# Patient Record
Sex: FEMALE | Race: BLACK OR AFRICAN AMERICAN
[De-identification: names, ages, dates, MRNs, and addresses within clinical notes are randomized per-mention and may not be internally consistent; named-entity substitution may affect disease eponyms.]

---

## 2017-04-28 ENCOUNTER — HOSPITAL ENCOUNTER (INPATIENT)
Dept: HOSPITAL 74 - YASAS | Age: 62
LOS: 4 days | Discharge: HOME | DRG: 897 | End: 2017-05-02
Attending: INTERNAL MEDICINE | Admitting: INTERNAL MEDICINE
Payer: COMMERCIAL

## 2017-04-28 VITALS — BODY MASS INDEX: 32.4 KG/M2

## 2017-04-28 DIAGNOSIS — E78.00: ICD-10-CM

## 2017-04-28 DIAGNOSIS — D64.9: ICD-10-CM

## 2017-04-28 DIAGNOSIS — F32.9: ICD-10-CM

## 2017-04-28 DIAGNOSIS — Z86.19: ICD-10-CM

## 2017-04-28 DIAGNOSIS — F19.24: ICD-10-CM

## 2017-04-28 DIAGNOSIS — F12.20: ICD-10-CM

## 2017-04-28 DIAGNOSIS — Z95.5: ICD-10-CM

## 2017-04-28 DIAGNOSIS — F10.230: Primary | ICD-10-CM

## 2017-04-28 LAB
APPEARANCE UR: (no result)
BACTERIA #/AREA URNS HPF: (no result) /HPF
BILIRUB UR STRIP.AUTO-MCNC: NEGATIVE MG/DL
COLOR UR: YELLOW
KETONES UR QL STRIP: NEGATIVE
LEUKOCYTE ESTERASE UR QL STRIP.AUTO: (no result)
MUCOUS THREADS URNS QL MICRO: (no result)
NITRITE UR QL STRIP: NEGATIVE
PH UR: 5 [PH] (ref 5–8)
PROT UR QL STRIP: NEGATIVE
PROT UR QL STRIP: NEGATIVE
RBC # BLD AUTO: 2 /HPF (ref 0–3)
RBC # UR STRIP: NEGATIVE /UL
SP GR UR: 1.02 (ref 1–1.03)
UROBILINOGEN UR STRIP-MCNC: NEGATIVE E.U./DL (ref 0.2–1)
WBC # UR AUTO: 35 /HPF (ref 3–5)

## 2017-04-28 RX ADMIN — Medication SCH MG: at 22:27

## 2017-04-28 RX ADMIN — NICOTINE SCH MG: 14 PATCH, EXTENDED RELEASE TRANSDERMAL at 13:36

## 2017-04-28 NOTE — CONSULT
[facility] Psychiatric Consult





- Data


Date of interview: 04/28/17


Admission source: [facility]


Identifying data: First admission to [place] campus for this 62 y/o AA female 

seeking detox treatment,on [address],for alcohol and marijuana dependence.Patient 

is single,a mother of two,domiciled,retired from [employer] and 

supported on pension + Social Security Disability benefits.


Substance Abuse History: - Smoking Cessation.  Smoking history: Current every 

day smoker.  Have you smoked in the past 12 months: Yes.  Aproximately how many 

cigarettes per day: 2.  Hx Chewing Tobacco Use: No.  Initiated information on 

smoking cessation: Yes.  'Breaking Loose' booklet given: 04/28/17.  - Substance 

& Tx. History.  Hx Alcohol Use: Yes (RUM/BEER).  Hx Substance Use: Yes (

MARIJUANA).  Substance Use Type: Alcohol, Marijuana.  Hx Substance Use Treatment

: Yes.  - Substances Abused.  ** Alcohol-rum/beer.  Route: Oral.  Frequency: 

Daily.  Amount used: 2 pts./8 (12 oz.).  Age of first use: 10.  Date of Last Use

: 04/27/17.  ** Marijuana.  Route: Smoking.  Frequency: 1-2 times per week.  

Amount used: $10-20.  Age of first use: 19.  Date of Last Use: 04/27/17.  

Confirmed by patient.


Medical History: Hypertension,hypercholesterolemia,anemia in the past,arthritis 

(both knees and hip),cardiac issues (two stents/cardiac catheterizations X 3),

orthosurgery for a torn meniscus and a history of treatment for urinary tract 

infections/syphilis.Hysterectomy (with one ovary left) in 1991.


Psychiatric History: History of two psychiatric hospitalizations at [facility].Diagnosed with MDD.Prescribed paxil 30 mg/day + wellbutrin XL 

300 mg/day + abilify 5 mg/hs.No contact with psychiatric OPD care 

providers.Patient informs that she depends on her primary care doctors for 

medication refills.Last took abilify " a few months ago ".Paxil and bupropion 

are reported to having been taken two days earlier prior to this [facility] visit.Ms Armijo admits to a history of one suicide attempt via wrist-cutting two 

years ago.She requests that paxil,abilify and bupropion be included in this 

regimen of medications.


Physical/Sexual Abuse/Trauma History: Patient denies.


Additional Comment: Urine Drug Screen Results: THC-Marijuana.Noted.





Mental Status Exam





- Mental Status Exam


Alert and Oriented to: Time, Place, Person


Cognitive Function: Good


Patient Appearance: Well Groomed


Mood: Apprehensive, Hopeful


Affect: Appropriate, Normal Range


Patient Behavior: Fatigued, Appropriate, Cooperative


Speech Pattern: Clear, Appropriate


Voice Loudness: Normal


Thought Process: Goal Oriented


Thought Disorder: Not Present


Hallucinations: Denies


Suicidal Ideation: Denies


Homicidal Ideation: Denies


Insight/Judgement: Poor


Sleep: Fair


Appetite: Good


Muscle strength/Tone: Normal


Gait/Station: Normal





Psychiatric Findings





- Problem List (Axis 1, 2,3)


(1) Alcohol dependence with uncomplicated withdrawal


Current Visit: Yes   Status: Acute





(2) Marihuana dependence


Current Visit: Yes   Status: Acute





(3) Substance induced mood disorder


Current Visit: Yes   Status: Acute





(4) Depressive disorder


Current Visit: Yes   Status: Chronic





(5) HTN (hypertension)


Current Visit: Yes   Status: Chronic   Qualifiers: 


     Hypertension type: essential hypertension        Qualified Code(s): I10 - 

Essential (primary) hypertension  





(6) Hypercholesterolemia


Current Visit: Yes   Status: Chronic





(7) S/P angioplasty with stent


Current Visit: Yes   Status: Chronic





(8) S/P cardiac catheterization


Current Visit: Yes   Status: Chronic





(9) History of anemia


Current Visit: Yes   Status: Suspected








- Initial Treatment Plan


Initial Treatment Plan: Psychoeducation.Detoxification.Medications : paxil 20 

mg po daily + wellbutrin  mg po daily + abilify 2 mg po hs (reduced dose)

.Side effects/benefits of each medication are discussed with the patient.She 

agrees with this careplan.Contact was established with [facility] (with 

patient's verbal authorization) at [phone] : filled scripts for wellbutrin 

Xl and paxil 30 mg were claimed on 4/11/17.Last claim for abilify 5 mg occurred 

in september 2016.Daily observation.

## 2017-04-28 NOTE — HP
CIWA Score





- CIWA Score


Nausea/Vomitin


Muscle Tremors: 4-Moderate,w/Arms Extend


Anxiety: 4-Mod. Anxious/Guarded


Agitation: 4-Moderately Restless


Paroxysmal Sweats: 1-Minimal Palms Moist


Orientation: 0-Oriented


Tacttile Disturbances: 3-Moderate Itch/Numb/Burn


Auditory Disturbances: 0-None


Visual Disturbances: 0-None


Headache: 0-None Present


CIWA-Ar Total Score: 21





Admission ROS [facility]





- HPI


Chief Complaint: 


DETOX TX FOR ALCOHOL DEPENDENCE


Allergies/Adverse Reactions: 


 Allergies











Allergy/AdvReac Type Severity Reaction Status Date / Time


 


No Known Allergies Allergy   Verified 17 09:54











History of Present Illness: 


62 Y/O AA/FEMALE WITH A HX OF ALCOHOL DEPENDENCE SEEKING DETOX TX. PT WAS TAKEN 

BY AMBULANCE TO [facility] ER LAST NIGHT DUE TO ALCOHOL INTOXICATION AND 

DISCHARGED THIS MORNING, REFERRED TO DETOX.


Exam Limitations: No Limitations





- Ebola screening


Have you traveled outside of the country in the last 21 days: No


Have you had contact with anyone from an Ebola affected area: No


Have you been sick,other than usual withdrawal symptoms: No


Do you have a fever: No





- Review of Systems


Constitutional: Chills, Loss of Appetite, Night Sweats, Changes in sleep


EENT: reports: Blurred Vision, Nose Congestion, Dental Problems (DENTAL 

EXTRACTION 17.)


Respiratory: reports: No Symptoms reported


Cardiac: reports: Lightheadedness


GI: reports: Diarrhea, Nausea, Poor Appetite, Poor Fluid Intake, Vomiting


: reports: Frequency


Musculoskeletal: reports: Back Pain, Joint Pain, Muscle Pain, Other (HX 

ARTHRITIS KNEE/HIP)


Integumentary: reports: No Symptoms Reported


Neuro: reports: Tremors, Unsteady Gait, Dizziness, Other (HX FALLS DUE TO 

ALCOHOL INTOXICATION; 2 BROKEN LEFT RIB 3/27/17.)


Endocrine: reports: No Symptoms Reported


Hematology: reports: No Symptoms Reported, Anemia


Psychiatric: reports: Orientated x3, Anxious, Depressed


Other Systems: Reviewed and Negative





Patient History





- Patient Medical History


Hx Anemia: Yes (B12  WEEKLY INJ IN THE PAST)


Hx Asthma: No


Hx Chronic Obstructive Pulmonary Disease (COPD): No


Hx Cardiac Disorders: Yes (2 stents-)


Hx Hypertension: Yes (ON MED)


Hx Hypercholesterolemia: Yes (ON MED)


HX Cerebrovascular Accident: No


Hx Seizures: No


Hx Diabetes: No


Hx Gastrointestinal Disorders: No


Hx Genitourinary Disorders: Yes (UTI IN THE PAST)


Hx Sexually Transmitted Disorders: Yes (SYPHILIS TX IN THE PAST)


Hx Renal Disease (ESRD): No


Hx Thyroid Disease: No


Hx Human Immunodeficiency Virus (HIV): No (NEGATIVE HX)


Hx Hepatitis C: No


Hx Depression: Yes (ON MED--ABILIFY;PAXIL)


Hx Suicide Attempt: Yes (cut left wrist in )


Hx Schizophrenia: No


Other Medical History: HX 1 STENT IN LEFT THIGH;BALLON I RIGHT THIGH- 2016





- Patient Surgical History


Past Surgical History: Yes


Hx Neurologic Surgery: No


Hx Cataract Extraction: No


Hx Cardiac Surgery: Yes (2 stents/cardiac catheterizations X 3)


Hx Lung Surgery: No


Hx Breast Surgery: No


Hx Breast Biopsy: No


Hx Abdominal Surgery: No


Hx Appendectomy: No


Hx Cholecystectomy: No


Hx Genitourinary Surgery: No


Hx  Section: No


Hx Orthopedic Surgery: No


Hx Hysterectomy: Yes (; WITH ONE OVARY INTACT.)


Other Surgical History: balloon, right extremity/stent. left extremity


Anesthesia Reaction: No





- PPD History


Previous Implant?: Yes


Documented Results: Positive w/o proof


Implanted On Prior [facility] Admission?: No


Results: CXR TBD


PPD to be Administered?: No





- Reproductive History


Patient is a Female of Child Bearing Age (11 -55 yrs old): No (HX HYSTERECTOMY W

/ ONE OVARY IN PLACE.)


LMP comment: -HYSTERECTOMY


Patient Pregnant: No





- Smoking Cessation


Smoking history: Current every day smoker


Have you smoked in the past 12 months: Yes


Aproximately how many cigarettes per day: 2


Hx Chewing Tobacco Use: No


Initiated information on smoking cessation: Yes


'Breaking Loose' booklet given: 17





- Substance & Tx. History


Hx Alcohol Use: Yes (RUM/BEER)


Hx Substance Use: Yes (MARIJUANA)


Substance Use Type: Alcohol, Marijuana


Hx Substance Use Treatment: Yes





- Substances Abused


  ** Alcohol-rum/beer


Route: Oral


Frequency: Daily


Amount used: 2 pts./8 (12 oz.)


Age of first use: 10


Date of Last Use: 17





  ** Marijuana


Route: Smoking


Frequency: 1-2 times per week


Amount used: $10-20


Age of first use: 19


Date of Last Use: 17





Family Disease History





- Family Disease History


Family Disease History: Heart Disease: Mother (MI; STROKE-), Other: 

Father (AIDS-), Mother





Admission Physical Exam [facility]





- Vital Signs


Vital Signs: 


 Vital Signs - 24 hr











  17





  08:55


 


Temperature 98.3 F


 


Pulse Rate 102 H


 


Respiratory 20





Rate 


 


Blood Pressure 159/104














- Physical


General Appearance: Yes: Moderate Distress, Obese, Irritable, Anxious


HEENTM: Yes: EOMI, Normocephalic, JEANNA, Pharynx Normal


Respiratory: Yes: Chest Non-Tender, Lungs Clear, Normal Breath Sounds, No 

Respiratory Distress


Neck: Yes: Supple, Trachea in good position


Breast: Yes: Breast Exam Deferred


Cardiology: Yes: Regular Rhythm, S1, S2, Tachycardia


Abdominal: Yes: Normal Bowel Sounds, Non Tender, Soft, Protuberent


Genitourinary: Yes: Other (N/C)


Back: Yes: Within Normal Limits


Musculoskeletal: Yes: full range of Motion, Gait Steady


Extremities: Yes: Normal Range of Motion, Non-Tender, Tremors


Neurological: Yes: CNs II-XII NML intact, Fully Oriented, Alert


Integumentary: Yes: Dry, Warm


Lymphatic: Yes: Within Normal Limits





- Diagnostic


(1) Alcohol dependence with uncomplicated withdrawal


Current Visit: Yes   Status: Acute





(2) HTN (hypertension)


Current Visit: Yes   Status: Chronic   Qualifiers: 


     Hypertension type: essential hypertension        Qualified Code(s): I10 - 

Essential (primary) hypertension  





(3) Hypercholesterolemia


Current Visit: Yes   Status: Chronic





(4) S/P cardiac catheterization


Current Visit: Yes   Status: Chronic





(5) S/P angioplasty with stent


Current Visit: Yes   Status: Chronic





(6) History of anemia


Current Visit: Yes   Status: Suspected








Cleared for Admission [facility]





- Detox or Rehab


[facility] Level of Care: Medically Managed


Detox Regimen/Protocol: Librium





BHS Breath Alcohol Content


Breath Alcohol Content: 0





Urine Pregancy Test





- Result


Urine Pregnancy Test Results: Negative- NO Line Present





Urine Drug Screen





- Results


Drug Screen Negative: No


Urine Drug Screen Results: THC-Marijuana

## 2017-04-29 LAB
ALBUMIN SERPL-MCNC: 3.8 G/DL (ref 3.4–5)
ALP SERPL-CCNC: 97 U/L (ref 45–117)
ALT SERPL-CCNC: 26 U/L (ref 12–78)
ANION GAP SERPL CALC-SCNC: 10 MMOL/L (ref 8–16)
APPEARANCE UR: CLEAR
AST SERPL-CCNC: 29 U/L (ref 15–37)
BILIRUB SERPL-MCNC: 0.5 MG/DL (ref 0.2–1)
BILIRUB UR STRIP.AUTO-MCNC: NEGATIVE MG/DL
CALCIUM SERPL-MCNC: 9.2 MG/DL (ref 8.5–10.1)
CO2 SERPL-SCNC: 25 MMOL/L (ref 21–32)
COCKROFT - GAULT: 86.11
COLOR UR: YELLOW
CREAT SERPL-MCNC: 1 MG/DL (ref 0.55–1.02)
DEPRECATED RDW RBC AUTO: 15.2 % (ref 11.6–15.6)
GLUCOSE SERPL-MCNC: 97 MG/DL (ref 74–106)
HIV 1 & 2 AB: NEGATIVE
HIV 1 AGP24: NEGATIVE
KETONES UR QL STRIP: NEGATIVE
LEUKOCYTE ESTERASE UR QL STRIP.AUTO: (no result)
MCH RBC QN AUTO: 29.3 PG (ref 25.7–33.7)
MCHC RBC AUTO-ENTMCNC: 31.1 G/DL (ref 32–36)
MCV RBC: 93.9 FL (ref 80–96)
NITRITE UR QL STRIP: NEGATIVE
PH UR: 6 [PH] (ref 5–8)
PLATELET # BLD AUTO: 185 K/MM3 (ref 134–434)
PMV BLD: 9.9 FL (ref 7.5–11.1)
PROT SERPL-MCNC: 7.7 G/DL (ref 6.4–8.2)
PROT UR QL STRIP: NEGATIVE
PROT UR QL STRIP: NEGATIVE
RBC # UR STRIP: NEGATIVE /UL
SICKLE CELL SCREEN: NEGATIVE
SP GR UR: 1.01 (ref 1–1.03)
UROBILINOGEN UR STRIP-MCNC: NEGATIVE E.U./DL (ref 0.2–1)
WBC # BLD AUTO: 8.3 K/MM3 (ref 4–10)

## 2017-04-29 RX ADMIN — PAROXETINE HYDROCHLORIDE SCH MG: 20 TABLET, FILM COATED ORAL at 10:12

## 2017-04-29 RX ADMIN — NICOTINE SCH MG: 14 PATCH, EXTENDED RELEASE TRANSDERMAL at 10:13

## 2017-04-29 RX ADMIN — Medication SCH TAB: at 10:12

## 2017-04-29 RX ADMIN — AMLODIPINE BESYLATE SCH MG: 10 TABLET ORAL at 10:12

## 2017-04-29 RX ADMIN — LISINOPRIL SCH MG: 20 TABLET ORAL at 10:12

## 2017-04-29 RX ADMIN — Medication SCH MG: at 22:12

## 2017-04-29 NOTE — PN
S CIWA





- CIWA Score


Nausea/Vomiting: 3


Muscle Tremors: 3


Anxiety: 3


Agitation: 3


Paroxysmal Sweats: 1-Minimal Palms Moist


Orientation: 0-Oriented


Tacttile Disturbances: 1-Very Mild Itch/Numbness


Auditory Disturbances: 1-Very Mild


Visual Disturbances: 1-Very Mild Sensitivity


Headache: 2-Mild


CIWA-Ar Total Score: 18





BHS Progress Note (SOAP)


Subjective: 


ALERT,IRRITABLE,ANXIOUS,INTERRUPTED SLEEP,TREMOR


Objective: 


04/29/17 10:12


 Vital Signs











Temperature  97.6 F   04/29/17 10:07


 


Pulse Rate  99 H  04/29/17 10:07


 


Respiratory Rate  20   04/29/17 10:07


 


Blood Pressure  148/109   04/29/17 10:07


 


O2 Sat by Pulse Oximetry (%)      








EKG NSR


NO CHEST PAIN,NO SB,NO DIZZINESS


 Laboratory Last Values











Urine Color  Yellow   04/28/17  14:00    


 


Urine Appearance  Slcloudy   04/28/17  14:00    


 


Urine pH  5.0  (5.0-8.0)   04/28/17  14:00    


 


Ur Specific Gravity  1.017  (1.001-1.035)   04/28/17  14:00    


 


Urine Protein  Negative  (NEGATIVE)   04/28/17  14:00    


 


Urine Glucose (UA)  Negative  (NEGATIVE)   04/28/17  14:00    


 


Urine Ketones  Negative  (NEGATIVE)   04/28/17  14:00    


 


Urine Blood  Negative  (NEGATIVE)   04/28/17  14:00    


 


Urine Nitrite  Negative  (NEGATIVE)   04/28/17  14:00    


 


Urine Bilirubin  Negative  (NEGATIVE)   04/28/17  14:00    


 


Urine Urobilinogen  Negative E.U./dl (0.2-1.0)   04/28/17  14:00    


 


Ur Leukocyte Esterase  1+  (NEGATIVE)  H  04/28/17  14:00    


 


Urine RBC  2 /hpf (0-3)   04/28/17  14:00    


 


Urine WBC  35 /hpf (3-5)   04/28/17  14:00    


 


Ur Epithelial Cells  Many /hpf (FEW)   04/28/17  14:00    


 


Urine Bacteria  Rare /hpf (NONE SEEN)   04/28/17  14:00    


 


Urine Mucus  Rare   04/28/17  14:00    


 


HIV 1&2 Antibody Screen  Negative   04/28/17  10:50    


 


HIV P24 Antigen  Negative   04/28/17  10:50    














04/29/17 10:13


LABS PENDING


Assessment: 


04/29/17 10:13


WITHDRAWAL SYMPTOM





04/29/17 10:13





Plan: 


CONTINUE DETOX,ENCOURAGE ORAL FLUID,REPEAT UA

## 2017-04-30 RX ADMIN — NICOTINE SCH MG: 14 PATCH, EXTENDED RELEASE TRANSDERMAL at 11:10

## 2017-04-30 RX ADMIN — Medication SCH MG: at 22:19

## 2017-04-30 RX ADMIN — AMLODIPINE BESYLATE SCH MG: 10 TABLET ORAL at 11:09

## 2017-04-30 RX ADMIN — Medication SCH TAB: at 11:09

## 2017-04-30 RX ADMIN — PAROXETINE HYDROCHLORIDE SCH MG: 20 TABLET, FILM COATED ORAL at 11:10

## 2017-04-30 RX ADMIN — LISINOPRIL SCH MG: 20 TABLET ORAL at 11:10

## 2017-04-30 NOTE — PN
S CIWA





- CIWA Score


Nausea/Vomiting: 3


Muscle Tremors: 3


Anxiety: 3


Agitation: 3


Paroxysmal Sweats: 1-Minimal Palms Moist


Orientation: 0-Oriented


Tacttile Disturbances: 1-Very Mild Itch/Numbness


Auditory Disturbances: 1-Very Mild


Visual Disturbances: 1-Very Mild Sensitivity


Headache: 2-Mild


CIWA-Ar Total Score: 18





BHS Progress Note (SOAP)


Subjective: 


ALERT,IRRITABLE,ANXIOUS,INTERRUPTED SLEEP,TREMOR


Objective: 





04/30/17 10:37


 Vital Signs











Temperature  98.2 F   04/30/17 10:06


 


Pulse Rate  101 H  04/30/17 10:06


 


Respiratory Rate  18   04/30/17 10:06


 


Blood Pressure  150/119   04/30/17 10:06


 


O2 Sat by Pulse Oximetry (%)      








 Laboratory Last Values











WBC  8.3 K/mm3 (4.0-10.0)   04/29/17  06:05    


 


RBC  5.11 M/mm3 (3.60-5.2)   04/29/17  06:05    


 


Hgb  15.0 GM/dL (10.7-15.3)   04/29/17  06:05    


 


Hct  48.0 % (32.4-45.2)  H  04/29/17  06:05    


 


MCV  93.9 fl (80-96)   04/29/17  06:05    


 


MCHC  31.1 g/dl (32.0-36.0)  L  04/29/17  06:05    


 


RDW  15.2 % (11.6-15.6)   04/29/17  06:05    


 


Plt Count  185 K/MM3 (134-434)   04/29/17  06:05    


 


MPV  9.9 fl (7.5-11.1)   04/29/17  06:05    


 


Sickle Cell Screen  Negative  (NEGATIVE)   04/29/17  06:05    


 


Sodium  142 mmol/L (136-145)   04/29/17  06:05    


 


Potassium  4.2 mmol/L (3.5-5.1)   04/29/17  06:05    


 


Chloride  107 mmol/L ()   04/29/17  06:05    


 


Carbon Dioxide  25 mmol/L (21-32)   04/29/17  06:05    


 


Anion Gap  10  (8-16)   04/29/17  06:05    


 


BUN  12 mg/dL (7-18)   04/29/17  06:05    


 


Creatinine  1.0 mg/dL (0.55-1.02)   04/29/17  06:05    


 


Creat Clearance w eGFR  56.56  (>60)   04/29/17  06:05    


 


Random Glucose  97 mg/dL ()   04/29/17  06:05    


 


Calcium  9.2 mg/dL (8.5-10.1)   04/29/17  06:05    


 


Total Bilirubin  0.5 mg/dL (0.2-1.0)   04/29/17  06:05    


 


AST  29 U/L (15-37)   04/29/17  06:05    


 


ALT  26 U/L (12-78)   04/29/17  06:05    


 


Alkaline Phosphatase  97 U/L ()   04/29/17  06:05    


 


Total Protein  7.7 g/dl (6.4-8.2)   04/29/17  06:05    


 


Albumin  3.8 g/dl (3.4-5.0)   04/29/17  06:05    


 


Urine Color  Yellow   04/29/17  Unknown


 


Urine Appearance  Clear   04/29/17  Unknown


 


Urine pH  6.0  (5.0-8.0)   04/29/17  Unknown


 


Ur Specific Gravity  1.015  (1.001-1.035)   04/29/17  Unknown


 


Urine Protein  Negative  (NEGATIVE)   04/29/17  Unknown


 


Urine Glucose (UA)  Negative  (NEGATIVE)   04/29/17  Unknown


 


Urine Ketones  Negative  (NEGATIVE)   04/29/17  Unknown


 


Urine Blood  Negative  (NEGATIVE)   04/29/17  Unknown


 


Urine Nitrite  Negative  (NEGATIVE)   04/29/17  Unknown


 


Urine Bilirubin  Negative  (NEGATIVE)   04/29/17  Unknown


 


Urine Urobilinogen  Negative E.U./dl (0.2-1.0)   04/29/17  Unknown


 


Ur Leukocyte Esterase  Trace  (NEGATIVE)  H D 04/29/17  Unknown


 


Urine RBC  2 /hpf (0-3)   04/28/17  14:00    


 


Urine WBC  35 /hpf (3-5)   04/28/17  14:00    


 


Ur Epithelial Cells  Many /hpf (FEW)   04/28/17  14:00    


 


Urine Bacteria  Rare /hpf (NONE SEEN)   04/28/17  14:00    


 


Urine Mucus  Rare   04/28/17  14:00    


 


RPR Titer  Nonreactive  (NONREACTIVE)   04/29/17  06:05    


 


HIV 1&2 Antibody Screen  Negative   04/28/17  10:50    


 


HIV P24 Antigen  Negative   04/28/17  10:50    











Assessment: 





04/30/17 10:37


WITHDRAWAL SYMPTOM


Plan: 


CONTINUE DETOX

## 2017-04-30 NOTE — PN
BHS Progress Note


Note: 


PATIENT COMPLAINED OF PAIN TO LEFT RIB CAGE ANTERIOR


HISTORY OF FX OF LEFT 6TH AND 7TH RIB ON 04/27/17 TREATED AT [facility] 

AFTER A FALL UNDER INFLUENCE OF ALCOHOL AT THAT TIME


S/P ANGIOPLASTY WITH STENT IN 2015 AT [facility]


STATED HAD CARDIAC CATH IN 04/27/17 WAS TOLD TO BE OK


ALERT


NO JUGULAR VEIN DILATATION


HEART NORMAL HEART SOUND,S1S2,NO MURMUR


LUNG CLEAR,NO WHEEZING


PAIN AND TENDERNESS OVER THE ANTERIOR RIB CAGE 6TH AND 7TH


ABDOMEN SOFT,NO DISTENSION


             NO PAIN OR TENDERNESS


NO CALF TENDERNESS


IMPRESSION PAIN SECONDARY TO FX LEFT 6TH AND 7TH RIBS


TREATMENT MOTRIN 600 MGS PO NOW


CLOSED MONITORING


CONTINUE DETOX

## 2017-04-30 NOTE — PN
BHS Progress Note


Note: 


addendum patient has cardiac catheterization in [facility] on 04/20/17 was told 

to be ok


fell while intoxicated on 03/27/17 treated at [facility] with fx left 6th 

and 7th ribs

## 2017-05-01 RX ADMIN — METOPROLOL SUCCINATE SCH MG: 25 TABLET, EXTENDED RELEASE ORAL at 10:15

## 2017-05-01 RX ADMIN — ANALGESIC BALM SCH: 1.74; 4.06 OINTMENT TOPICAL at 22:08

## 2017-05-01 RX ADMIN — PAROXETINE HYDROCHLORIDE SCH MG: 20 TABLET, FILM COATED ORAL at 10:15

## 2017-05-01 RX ADMIN — CLOPIDOGREL BISULFATE SCH MG: 75 TABLET, FILM COATED ORAL at 10:15

## 2017-05-01 RX ADMIN — ANALGESIC BALM SCH APPLIC: 1.74; 4.06 OINTMENT TOPICAL at 10:16

## 2017-05-01 RX ADMIN — LISINOPRIL SCH MG: 20 TABLET ORAL at 10:15

## 2017-05-01 RX ADMIN — Medication SCH TAB: at 10:14

## 2017-05-01 RX ADMIN — NICOTINE SCH MG: 14 PATCH, EXTENDED RELEASE TRANSDERMAL at 10:15

## 2017-05-01 RX ADMIN — AMLODIPINE BESYLATE SCH MG: 10 TABLET ORAL at 10:15

## 2017-05-01 RX ADMIN — Medication SCH MG: at 22:07

## 2017-05-01 NOTE — PN
BHS Progress Note (SOAP)


Subjective: 


sweats, rib pain on rt 


Objective: 





05/01/17 09:34


 Vital Signs











Temperature  98.1 F   05/01/17 06:00


 


Pulse Rate  85   05/01/17 06:00


 


Respiratory Rate  18   05/01/17 06:00


 


Blood Pressure  141/97   05/01/17 06:00


 


O2 Sat by Pulse Oximetry (%)      








 Laboratory Tests











  04/28/17 04/28/17 04/29/17





  10:50 14:00 06:05


 


WBC    8.3


 


RBC    5.11


 


Hgb    15.0


 


Hct    48.0 H


 


MCV    93.9


 


MCHC    31.1 L


 


RDW    15.2


 


Plt Count    185


 


MPV    9.9


 


Sickle Cell Screen    Negative


 


Sodium   


 


Potassium   


 


Chloride   


 


Carbon Dioxide   


 


Anion Gap   


 


BUN   


 


Creatinine   


 


Creat Clearance w eGFR   


 


Random Glucose   


 


Calcium   


 


Total Bilirubin   


 


AST   


 


ALT   


 


Alkaline Phosphatase   


 


Total Protein   


 


Albumin   


 


Urine Color   Yellow 


 


Urine Appearance   Slcloudy 


 


Urine pH   5.0 


 


Ur Specific Gravity   1.017 


 


Urine Protein   Negative 


 


Urine Glucose (UA)   Negative 


 


Urine Ketones   Negative 


 


Urine Blood   Negative 


 


Urine Nitrite   Negative 


 


Urine Bilirubin   Negative 


 


Urine Urobilinogen   Negative 


 


Ur Leukocyte Esterase   1+ H 


 


Urine RBC   2 


 


Urine WBC   35 


 


Ur Epithelial Cells   Many 


 


Urine Bacteria   Rare 


 


Urine Mucus   Rare 


 


RPR Titer   


 


HIV 1&2 Antibody Screen  Negative  


 


HIV P24 Antigen  Negative  














  04/29/17 04/29/17 04/29/17





  06:05 06:05 Unknown


 


WBC   


 


RBC   


 


Hgb   


 


Hct   


 


MCV   


 


MCHC   


 


RDW   


 


Plt Count   


 


MPV   


 


Sickle Cell Screen   


 


Sodium  142  


 


Potassium  4.2  


 


Chloride  107  


 


Carbon Dioxide  25  


 


Anion Gap  10  


 


BUN  12  


 


Creatinine  1.0  


 


Creat Clearance w eGFR  56.56  


 


Random Glucose  97  


 


Calcium  9.2  


 


Total Bilirubin  0.5  


 


AST  29  


 


ALT  26  


 


Alkaline Phosphatase  97  


 


Total Protein  7.7  


 


Albumin  3.8  


 


Urine Color    Yellow


 


Urine Appearance    Clear


 


Urine pH    6.0


 


Ur Specific Gravity    1.015


 


Urine Protein    Negative


 


Urine Glucose (UA)    Negative


 


Urine Ketones    Negative


 


Urine Blood    Negative


 


Urine Nitrite    Negative


 


Urine Bilirubin    Negative


 


Urine Urobilinogen    Negative


 


Ur Leukocyte Esterase    Trace H D


 


Urine RBC   


 


Urine WBC   


 


Ur Epithelial Cells   


 


Urine Bacteria   


 


Urine Mucus   


 


RPR Titer   Nonreactive 


 


HIV 1&2 Antibody Screen   


 


HIV P24 Antigen   











Assessment: 


05/01/17 09:34


withdrawal sx's 


h/o rib fx





05/01/17 10:52





Plan: 


cont. detox 


increase fluids 


analgesic balm 


motrin prn

## 2017-05-01 NOTE — EKG
Test Reason : 

Blood Pressure : ***/*** mmHG

Vent. Rate : 093 BPM     Atrial Rate : 093 BPM

   P-R Int : 138 ms          QRS Dur : 086 ms

    QT Int : 356 ms       P-R-T Axes : 060 055 043 degrees

   QTc Int : 442 ms

 

NORMAL SINUS RHYTHM

POSSIBLE LEFT ATRIAL ENLARGEMENT

BORDERLINE ECG

NO PREVIOUS ECGS AVAILABLE

Confirmed by ALEX PARKS, BEHZAD ([id number]) on 5/1/2017 12:15:09 AM

 

Referred By: Radu Luz           Confirmed By:BEHZAD JOHNSON MD

## 2017-05-02 VITALS — HEART RATE: 89 BPM | DIASTOLIC BLOOD PRESSURE: 94 MMHG | SYSTOLIC BLOOD PRESSURE: 156 MMHG

## 2017-05-02 VITALS — TEMPERATURE: 98.2 F

## 2017-05-02 PROCEDURE — HZ2ZZZZ DETOXIFICATION SERVICES FOR SUBSTANCE ABUSE TREATMENT: ICD-10-PCS | Performed by: INTERNAL MEDICINE

## 2017-05-02 RX ADMIN — ANALGESIC BALM SCH: 1.74; 4.06 OINTMENT TOPICAL at 09:33

## 2017-05-02 RX ADMIN — METOPROLOL SUCCINATE SCH MG: 25 TABLET, EXTENDED RELEASE ORAL at 09:32

## 2017-05-02 RX ADMIN — LISINOPRIL SCH MG: 20 TABLET ORAL at 09:32

## 2017-05-02 RX ADMIN — CLOPIDOGREL BISULFATE SCH MG: 75 TABLET, FILM COATED ORAL at 09:32

## 2017-05-02 RX ADMIN — PAROXETINE HYDROCHLORIDE SCH MG: 20 TABLET, FILM COATED ORAL at 09:32

## 2017-05-02 RX ADMIN — Medication SCH TAB: at 09:32

## 2017-05-02 RX ADMIN — AMLODIPINE BESYLATE SCH MG: 10 TABLET ORAL at 09:32

## 2017-05-02 NOTE — DS
BHS Detox Discharge Summary


Admission Date: 


04/28/17





Discharge Date: 05/02/17





- History


Present History: Alcohol Dependence, Cannabis Dependence





- Physical Exam Results


Vital Signs: 


 Vital Signs











Temperature  98.2 F   05/02/17 06:00


 


Pulse Rate  78   05/02/17 06:00


 


Respiratory Rate  18   05/02/17 06:00


 


Blood Pressure  147/93   05/02/17 06:00


 


O2 Sat by Pulse Oximetry (%)      














- Treatment


Hospital Course: Detox Protocol Followed, Detoxed Safely, Responded well, 

Discharged Condition Good, Rehab Referral Accepted





- Medication


Discharge Medications: 


Ambulatory Orders





Amlodipine Besylate [Norvasc -] 10 mg PO DAILY 04/28/17 


Aripiprazole [Abilify -] 5 mg PO DAILY 04/28/17 


Atorvastatin Ca [Lipitor] 80 mg PO HS 04/28/17 


Bupropion HCl [Wellbutrin Sr] 150 mg PO BID 04/28/17 


Clopidogrel Bisulfate [Plavix -] 75 mg PO DAILY 04/28/17 


Lisinopril [Prinivil] 20 mg PO DAILY 04/28/17 


Metoprolol Succinate [Toprol Xl -] 25 mg PO DAILY 04/28/17 


Nicotine Patch [Nicoderm Patch -] 1 patch TD DAILY 04/28/17 


Paroxetine HCl [Paxil -] 30 mg PO DAILY 04/28/17 











- Diagnosis


(1) Alcohol dependence with uncomplicated withdrawal


Current Visit: Yes   Status: Chronic





(2) Marihuana dependence


Current Visit: Yes   Status: Chronic





(3) Substance induced mood disorder


Current Visit: Yes   Status: Chronic





(4) Depressive disorder


Current Visit: Yes   Status: Chronic





(5) HTN (hypertension)


Current Visit: Yes   Status: Chronic   Qualifiers: 


     Hypertension type: essential hypertension        Qualified Code(s): I10 - 

Essential (primary) hypertension  





(6) Hypercholesterolemia


Current Visit: Yes   Status: Chronic





(7) S/P angioplasty with stent


Current Visit: Yes   Status: Chronic





(8) S/P cardiac catheterization


Current Visit: Yes   Status: Chronic





(9) History of anemia


Current Visit: Yes   Status: Suspected








- AMA


Did Patient Leave Against Medical Advice: No

## 2020-09-28 ENCOUNTER — HOSPITAL ENCOUNTER (INPATIENT)
Dept: HOSPITAL 74 - YASAS | Age: 65
LOS: 14 days | Discharge: HOME | DRG: 895 | End: 2020-10-12
Attending: ALLERGY & IMMUNOLOGY | Admitting: ALLERGY & IMMUNOLOGY
Payer: COMMERCIAL

## 2020-09-28 VITALS — BODY MASS INDEX: 24.3 KG/M2

## 2020-09-28 DIAGNOSIS — F12.20: ICD-10-CM

## 2020-09-28 DIAGNOSIS — Z91.5: ICD-10-CM

## 2020-09-28 DIAGNOSIS — Z87.440: ICD-10-CM

## 2020-09-28 DIAGNOSIS — I10: ICD-10-CM

## 2020-09-28 DIAGNOSIS — F10.20: Primary | ICD-10-CM

## 2020-09-28 DIAGNOSIS — L29.2: ICD-10-CM

## 2020-09-28 DIAGNOSIS — Z99.89: ICD-10-CM

## 2020-09-28 DIAGNOSIS — I25.10: ICD-10-CM

## 2020-09-28 DIAGNOSIS — Z95.5: ICD-10-CM

## 2020-09-28 DIAGNOSIS — Z86.19: ICD-10-CM

## 2020-09-28 DIAGNOSIS — Z95.820: ICD-10-CM

## 2020-09-28 DIAGNOSIS — Z88.5: ICD-10-CM

## 2020-09-28 DIAGNOSIS — R76.11: ICD-10-CM

## 2020-09-28 DIAGNOSIS — G47.00: ICD-10-CM

## 2020-09-28 DIAGNOSIS — D50.9: ICD-10-CM

## 2020-09-28 DIAGNOSIS — F19.20: ICD-10-CM

## 2020-09-28 DIAGNOSIS — G40.509: ICD-10-CM

## 2020-09-28 DIAGNOSIS — Z88.8: ICD-10-CM

## 2020-09-28 DIAGNOSIS — F14.20: ICD-10-CM

## 2020-09-28 DIAGNOSIS — F17.210: ICD-10-CM

## 2020-09-28 PROCEDURE — U0003 INFECTIOUS AGENT DETECTION BY NUCLEIC ACID (DNA OR RNA); SEVERE ACUTE RESPIRATORY SYNDROME CORONAVIRUS 2 (SARS-COV-2) (CORONAVIRUS DISEASE [COVID-19]), AMPLIFIED PROBE TECHNIQUE, MAKING USE OF HIGH THROUGHPUT TECHNOLOGIES AS DESCRIBED BY CMS-2020-01-R: HCPCS

## 2020-09-28 PROCEDURE — HZ42ZZZ GROUP COUNSELING FOR SUBSTANCE ABUSE TREATMENT, COGNITIVE-BEHAVIORAL: ICD-10-PCS | Performed by: ALLERGY & IMMUNOLOGY

## 2020-09-28 RX ADMIN — Medication SCH MG: at 21:15

## 2020-09-29 LAB
ALBUMIN SERPL-MCNC: 3.5 G/DL (ref 3.4–5)
ALP SERPL-CCNC: 99 U/L (ref 45–117)
ALT SERPL-CCNC: 32 U/L (ref 13–61)
ANION GAP SERPL CALC-SCNC: 6 MMOL/L (ref 8–16)
APPEARANCE UR: CLEAR
AST SERPL-CCNC: 27 U/L (ref 15–37)
BACTERIA # UR AUTO: 225 /UL (ref 0–1359)
BILIRUB SERPL-MCNC: 0.4 MG/DL (ref 0.2–1)
BILIRUB UR STRIP.AUTO-MCNC: NEGATIVE MG/DL
BUN SERPL-MCNC: 22 MG/DL (ref 7–18)
CALCIUM SERPL-MCNC: 9.1 MG/DL (ref 8.5–10.1)
CASTS URNS QL MICRO: 1 /UL (ref 0–3.1)
CHLORIDE SERPL-SCNC: 104 MMOL/L (ref 98–107)
CO2 SERPL-SCNC: 28 MMOL/L (ref 21–32)
COLOR UR: YELLOW
CREAT SERPL-MCNC: 1.5 MG/DL (ref 0.55–1.3)
DEPRECATED RDW RBC AUTO: 14.7 % (ref 11.6–15.6)
EPITH CASTS URNS QL MICRO: 16 /UL (ref 0–25.1)
GLUCOSE SERPL-MCNC: 136 MG/DL (ref 74–106)
HCT VFR BLD CALC: 44.7 % (ref 32.4–45.2)
HGB BLD-MCNC: 14.2 GM/DL (ref 10.7–15.3)
KETONES UR QL STRIP: NEGATIVE
LEUKOCYTE ESTERASE UR QL STRIP.AUTO: (no result)
MCH RBC QN AUTO: 28.5 PG (ref 25.7–33.7)
MCHC RBC AUTO-ENTMCNC: 31.8 G/DL (ref 32–36)
MCV RBC: 89.5 FL (ref 80–96)
NITRITE UR QL STRIP: NEGATIVE
PH UR: 6 [PH] (ref 5–8)
PLATELET # BLD AUTO: 213 K/MM3 (ref 134–434)
PMV BLD: 10.7 FL (ref 7.5–11.1)
POTASSIUM SERPLBLD-SCNC: 4 MMOL/L (ref 3.5–5.1)
PROT SERPL-MCNC: 7.3 G/DL (ref 6.4–8.2)
PROT UR QL STRIP: NEGATIVE
PROT UR QL STRIP: NEGATIVE
RBC # BLD AUTO: 4.99 M/MM3 (ref 3.6–5.2)
RBC # BLD AUTO: 9 /UL (ref 0–23.9)
SICKLE CELL SCREEN: NEGATIVE
SODIUM SERPL-SCNC: 138 MMOL/L (ref 136–145)
SP GR UR: 1.01 (ref 1.01–1.03)
UROBILINOGEN UR STRIP-MCNC: 1 MG/DL (ref 0.2–1)
WBC # BLD AUTO: 9.9 K/MM3 (ref 4–10)
WBC # UR AUTO: 303 /UL (ref 0–25.8)

## 2020-09-29 RX ADMIN — Medication SCH MG: at 21:06

## 2020-09-29 RX ADMIN — NICOTINE SCH MG: 21 PATCH TRANSDERMAL at 09:26

## 2020-09-29 RX ADMIN — AMLODIPINE BESYLATE SCH MG: 10 TABLET ORAL at 11:04

## 2020-09-29 RX ADMIN — ATORVASTATIN CALCIUM SCH MG: 80 TABLET, FILM COATED ORAL at 21:06

## 2020-09-29 RX ADMIN — CLOPIDOGREL BISULFATE SCH MG: 75 TABLET, FILM COATED ORAL at 11:15

## 2020-09-29 RX ADMIN — METHOCARBAMOL PRN MG: 500 TABLET ORAL at 21:06

## 2020-09-29 RX ADMIN — Medication SCH TAB: at 09:25

## 2020-09-30 RX ADMIN — NICOTINE SCH MG: 21 PATCH TRANSDERMAL at 09:29

## 2020-09-30 RX ADMIN — ACETAMINOPHEN PRN MG: 325 TABLET ORAL at 19:50

## 2020-09-30 RX ADMIN — METHOCARBAMOL PRN MG: 500 TABLET ORAL at 21:12

## 2020-09-30 RX ADMIN — ATORVASTATIN CALCIUM SCH MG: 80 TABLET, FILM COATED ORAL at 21:11

## 2020-09-30 RX ADMIN — Medication SCH MG: at 21:10

## 2020-09-30 RX ADMIN — Medication SCH MG: at 21:12

## 2020-09-30 RX ADMIN — MIRTAZAPINE SCH MG: 15 TABLET, FILM COATED ORAL at 21:10

## 2020-09-30 RX ADMIN — TRAZODONE HYDROCHLORIDE SCH MG: 50 TABLET ORAL at 21:10

## 2020-09-30 RX ADMIN — CLOPIDOGREL BISULFATE SCH MG: 75 TABLET, FILM COATED ORAL at 09:30

## 2020-09-30 RX ADMIN — AMLODIPINE BESYLATE SCH MG: 10 TABLET ORAL at 09:29

## 2020-09-30 RX ADMIN — PAROXETINE HYDROCHLORIDE SCH MG: 20 TABLET, FILM COATED ORAL at 09:30

## 2020-09-30 RX ADMIN — Medication SCH TAB: at 09:30

## 2020-10-01 RX ADMIN — Medication SCH MG: at 21:03

## 2020-10-01 RX ADMIN — Medication SCH MG: at 21:04

## 2020-10-01 RX ADMIN — NICOTINE POLACRILEX PRN MG: 2 GUM, CHEWING ORAL at 09:27

## 2020-10-01 RX ADMIN — ATORVASTATIN CALCIUM SCH MG: 80 TABLET, FILM COATED ORAL at 21:04

## 2020-10-01 RX ADMIN — AMLODIPINE BESYLATE SCH MG: 10 TABLET ORAL at 09:27

## 2020-10-01 RX ADMIN — METHOCARBAMOL PRN MG: 500 TABLET ORAL at 21:03

## 2020-10-01 RX ADMIN — MIRTAZAPINE SCH MG: 15 TABLET, FILM COATED ORAL at 21:03

## 2020-10-01 RX ADMIN — Medication SCH TAB: at 09:27

## 2020-10-01 RX ADMIN — CLOPIDOGREL BISULFATE SCH MG: 75 TABLET, FILM COATED ORAL at 09:28

## 2020-10-01 RX ADMIN — PAROXETINE HYDROCHLORIDE SCH MG: 20 TABLET, FILM COATED ORAL at 10:04

## 2020-10-01 RX ADMIN — TRAZODONE HYDROCHLORIDE SCH MG: 50 TABLET ORAL at 21:03

## 2020-10-01 RX ADMIN — NICOTINE SCH MG: 21 PATCH TRANSDERMAL at 09:27

## 2020-10-02 RX ADMIN — MIRTAZAPINE SCH MG: 15 TABLET, FILM COATED ORAL at 21:03

## 2020-10-02 RX ADMIN — Medication SCH TAB: at 09:42

## 2020-10-02 RX ADMIN — ACETAMINOPHEN PRN MG: 325 TABLET ORAL at 06:50

## 2020-10-02 RX ADMIN — Medication SCH MG: at 21:04

## 2020-10-02 RX ADMIN — ATORVASTATIN CALCIUM SCH MG: 80 TABLET, FILM COATED ORAL at 21:02

## 2020-10-02 RX ADMIN — NICOTINE POLACRILEX PRN MG: 2 GUM, CHEWING ORAL at 21:08

## 2020-10-02 RX ADMIN — NICOTINE SCH MG: 21 PATCH TRANSDERMAL at 09:41

## 2020-10-02 RX ADMIN — NICOTINE POLACRILEX PRN MG: 2 GUM, CHEWING ORAL at 09:41

## 2020-10-02 RX ADMIN — TRAZODONE HYDROCHLORIDE SCH MG: 50 TABLET ORAL at 21:03

## 2020-10-02 RX ADMIN — AMLODIPINE BESYLATE SCH MG: 10 TABLET ORAL at 09:42

## 2020-10-02 RX ADMIN — NICOTINE POLACRILEX PRN MG: 2 GUM, CHEWING ORAL at 12:39

## 2020-10-02 RX ADMIN — PAROXETINE HYDROCHLORIDE SCH MG: 20 TABLET, FILM COATED ORAL at 09:42

## 2020-10-02 RX ADMIN — CLOPIDOGREL BISULFATE SCH MG: 75 TABLET, FILM COATED ORAL at 09:42

## 2020-10-03 RX ADMIN — Medication SCH MG: at 21:14

## 2020-10-03 RX ADMIN — ATORVASTATIN CALCIUM SCH MG: 80 TABLET, FILM COATED ORAL at 21:10

## 2020-10-03 RX ADMIN — Medication SCH TAB: at 09:55

## 2020-10-03 RX ADMIN — PAROXETINE HYDROCHLORIDE SCH MG: 20 TABLET, FILM COATED ORAL at 09:55

## 2020-10-03 RX ADMIN — Medication SCH MG: at 21:09

## 2020-10-03 RX ADMIN — NICOTINE SCH MG: 21 PATCH TRANSDERMAL at 09:54

## 2020-10-03 RX ADMIN — TRAZODONE HYDROCHLORIDE SCH MG: 50 TABLET ORAL at 21:10

## 2020-10-03 RX ADMIN — NICOTINE POLACRILEX PRN MG: 2 GUM, CHEWING ORAL at 09:57

## 2020-10-03 RX ADMIN — METHOCARBAMOL PRN MG: 500 TABLET ORAL at 22:05

## 2020-10-03 RX ADMIN — AMLODIPINE BESYLATE SCH MG: 10 TABLET ORAL at 09:55

## 2020-10-03 RX ADMIN — NICOTINE POLACRILEX PRN MG: 2 GUM, CHEWING ORAL at 14:19

## 2020-10-03 RX ADMIN — MIRTAZAPINE SCH MG: 30 TABLET, FILM COATED ORAL at 21:10

## 2020-10-03 RX ADMIN — CLOPIDOGREL BISULFATE SCH MG: 75 TABLET, FILM COATED ORAL at 09:55

## 2020-10-04 RX ADMIN — METHOCARBAMOL PRN MG: 500 TABLET ORAL at 21:49

## 2020-10-04 RX ADMIN — CLOPIDOGREL BISULFATE SCH MG: 75 TABLET, FILM COATED ORAL at 09:11

## 2020-10-04 RX ADMIN — NICOTINE POLACRILEX PRN MG: 2 GUM, CHEWING ORAL at 06:39

## 2020-10-04 RX ADMIN — AMLODIPINE BESYLATE SCH MG: 10 TABLET ORAL at 09:10

## 2020-10-04 RX ADMIN — Medication SCH MG: at 21:47

## 2020-10-04 RX ADMIN — MIRTAZAPINE SCH MG: 30 TABLET, FILM COATED ORAL at 21:48

## 2020-10-04 RX ADMIN — NICOTINE SCH MG: 21 PATCH TRANSDERMAL at 09:10

## 2020-10-04 RX ADMIN — NICOTINE POLACRILEX PRN MG: 2 GUM, CHEWING ORAL at 09:12

## 2020-10-04 RX ADMIN — NICOTINE POLACRILEX PRN MG: 2 GUM, CHEWING ORAL at 17:59

## 2020-10-04 RX ADMIN — Medication SCH TAB: at 09:11

## 2020-10-04 RX ADMIN — TRAZODONE HYDROCHLORIDE SCH MG: 50 TABLET ORAL at 21:46

## 2020-10-04 RX ADMIN — PAROXETINE HYDROCHLORIDE SCH MG: 20 TABLET, FILM COATED ORAL at 09:11

## 2020-10-04 RX ADMIN — ATORVASTATIN CALCIUM SCH MG: 80 TABLET, FILM COATED ORAL at 21:47

## 2020-10-04 RX ADMIN — NICOTINE POLACRILEX PRN MG: 2 GUM, CHEWING ORAL at 21:50

## 2020-10-04 RX ADMIN — Medication SCH MG: at 21:46

## 2020-10-05 RX ADMIN — PAROXETINE HYDROCHLORIDE SCH MG: 20 TABLET, FILM COATED ORAL at 10:20

## 2020-10-05 RX ADMIN — MIRTAZAPINE SCH MG: 30 TABLET, FILM COATED ORAL at 21:17

## 2020-10-05 RX ADMIN — CLOPIDOGREL BISULFATE SCH MG: 75 TABLET, FILM COATED ORAL at 10:19

## 2020-10-05 RX ADMIN — TRAZODONE HYDROCHLORIDE SCH MG: 50 TABLET ORAL at 21:18

## 2020-10-05 RX ADMIN — METHOCARBAMOL PRN MG: 500 TABLET ORAL at 21:18

## 2020-10-05 RX ADMIN — Medication SCH MG: at 21:16

## 2020-10-05 RX ADMIN — Medication SCH TAB: at 10:20

## 2020-10-05 RX ADMIN — AMLODIPINE BESYLATE SCH MG: 10 TABLET ORAL at 10:19

## 2020-10-05 RX ADMIN — Medication SCH MG: at 21:18

## 2020-10-05 RX ADMIN — NICOTINE POLACRILEX PRN MG: 2 GUM, CHEWING ORAL at 10:21

## 2020-10-05 RX ADMIN — MICONAZOLE NITRATE SCH APPLIC: 20 CREAM TOPICAL at 21:19

## 2020-10-05 RX ADMIN — NICOTINE POLACRILEX PRN MG: 2 GUM, CHEWING ORAL at 07:04

## 2020-10-05 RX ADMIN — ATORVASTATIN CALCIUM SCH MG: 80 TABLET, FILM COATED ORAL at 21:19

## 2020-10-05 RX ADMIN — NICOTINE SCH MG: 21 PATCH TRANSDERMAL at 10:19

## 2020-10-05 RX ADMIN — NICOTINE POLACRILEX PRN MG: 2 GUM, CHEWING ORAL at 13:41

## 2020-10-06 RX ADMIN — Medication SCH MG: at 21:26

## 2020-10-06 RX ADMIN — Medication SCH MG: at 21:25

## 2020-10-06 RX ADMIN — CLOPIDOGREL BISULFATE SCH MG: 75 TABLET, FILM COATED ORAL at 09:53

## 2020-10-06 RX ADMIN — MICONAZOLE NITRATE SCH APPLIC: 20 CREAM TOPICAL at 09:51

## 2020-10-06 RX ADMIN — NICOTINE SCH MG: 21 PATCH TRANSDERMAL at 09:51

## 2020-10-06 RX ADMIN — PAROXETINE HYDROCHLORIDE SCH MG: 20 TABLET, FILM COATED ORAL at 09:53

## 2020-10-06 RX ADMIN — NICOTINE POLACRILEX PRN MG: 2 GUM, CHEWING ORAL at 21:29

## 2020-10-06 RX ADMIN — Medication SCH TAB: at 09:53

## 2020-10-06 RX ADMIN — MICONAZOLE NITRATE SCH APPLIC: 20 CREAM TOPICAL at 21:26

## 2020-10-06 RX ADMIN — MIRTAZAPINE SCH MG: 30 TABLET, FILM COATED ORAL at 21:27

## 2020-10-06 RX ADMIN — ATORVASTATIN CALCIUM SCH MG: 80 TABLET, FILM COATED ORAL at 21:26

## 2020-10-06 RX ADMIN — AMLODIPINE BESYLATE SCH MG: 10 TABLET ORAL at 09:52

## 2020-10-06 RX ADMIN — NICOTINE POLACRILEX PRN MG: 2 GUM, CHEWING ORAL at 09:51

## 2020-10-06 RX ADMIN — TRAZODONE HYDROCHLORIDE SCH MG: 50 TABLET ORAL at 21:25

## 2020-10-06 RX ADMIN — NICOTINE POLACRILEX PRN MG: 2 GUM, CHEWING ORAL at 06:33

## 2020-10-07 RX ADMIN — AMLODIPINE BESYLATE SCH MG: 10 TABLET ORAL at 09:05

## 2020-10-07 RX ADMIN — MICONAZOLE NITRATE SCH APPLIC: 20 CREAM TOPICAL at 21:41

## 2020-10-07 RX ADMIN — Medication SCH MG: at 21:38

## 2020-10-07 RX ADMIN — MICONAZOLE NITRATE SCH APPLIC: 20 CREAM TOPICAL at 09:04

## 2020-10-07 RX ADMIN — MIRTAZAPINE SCH MG: 30 TABLET, FILM COATED ORAL at 21:41

## 2020-10-07 RX ADMIN — NICOTINE POLACRILEX PRN MG: 2 GUM, CHEWING ORAL at 19:32

## 2020-10-07 RX ADMIN — ATORVASTATIN CALCIUM SCH MG: 80 TABLET, FILM COATED ORAL at 21:40

## 2020-10-07 RX ADMIN — CLOPIDOGREL BISULFATE SCH MG: 75 TABLET, FILM COATED ORAL at 09:05

## 2020-10-07 RX ADMIN — Medication SCH MG: at 21:40

## 2020-10-07 RX ADMIN — NICOTINE POLACRILEX PRN MG: 2 GUM, CHEWING ORAL at 07:00

## 2020-10-07 RX ADMIN — TRAZODONE HYDROCHLORIDE SCH MG: 50 TABLET ORAL at 21:38

## 2020-10-07 RX ADMIN — Medication SCH TAB: at 09:06

## 2020-10-07 RX ADMIN — PAROXETINE HYDROCHLORIDE SCH MG: 20 TABLET, FILM COATED ORAL at 09:05

## 2020-10-07 RX ADMIN — NICOTINE SCH MG: 21 PATCH TRANSDERMAL at 09:04

## 2020-10-08 LAB
APPEARANCE UR: (no result)
BACTERIA # UR AUTO: 36 /UL (ref 0–1359)
BILIRUB UR STRIP.AUTO-MCNC: NEGATIVE MG/DL
CASTS URNS QL MICRO: 0 /UL (ref 0–3.1)
COLOR UR: YELLOW
EPITH CASTS URNS QL MICRO: 21 /UL (ref 0–25.1)
KETONES UR QL STRIP: NEGATIVE
LEUKOCYTE ESTERASE UR QL STRIP.AUTO: (no result)
NITRITE UR QL STRIP: NEGATIVE
PH UR: 7.5 [PH] (ref 5–8)
PROT UR QL STRIP: NEGATIVE
PROT UR QL STRIP: NEGATIVE
RBC # BLD AUTO: 3 /UL (ref 0–23.9)
SP GR UR: 1 (ref 1.01–1.03)
UROBILINOGEN UR STRIP-MCNC: 0.2 MG/DL (ref 0.2–1)
WBC # UR AUTO: 19 /UL (ref 0–25.8)

## 2020-10-08 RX ADMIN — PAROXETINE HYDROCHLORIDE SCH MG: 20 TABLET, FILM COATED ORAL at 10:03

## 2020-10-08 RX ADMIN — NICOTINE POLACRILEX PRN MG: 2 GUM, CHEWING ORAL at 21:26

## 2020-10-08 RX ADMIN — Medication SCH MG: at 21:22

## 2020-10-08 RX ADMIN — Medication SCH TAB: at 10:02

## 2020-10-08 RX ADMIN — MIRTAZAPINE SCH MG: 30 TABLET, FILM COATED ORAL at 21:23

## 2020-10-08 RX ADMIN — TRAZODONE HYDROCHLORIDE SCH MG: 50 TABLET ORAL at 21:22

## 2020-10-08 RX ADMIN — NICOTINE POLACRILEX PRN MG: 2 GUM, CHEWING ORAL at 07:10

## 2020-10-08 RX ADMIN — CLOPIDOGREL BISULFATE SCH MG: 75 TABLET, FILM COATED ORAL at 10:03

## 2020-10-08 RX ADMIN — NICOTINE SCH MG: 21 PATCH TRANSDERMAL at 10:01

## 2020-10-08 RX ADMIN — NICOTINE POLACRILEX PRN MG: 2 GUM, CHEWING ORAL at 15:58

## 2020-10-08 RX ADMIN — MICONAZOLE NITRATE SCH APPLIC: 20 CREAM TOPICAL at 21:23

## 2020-10-08 RX ADMIN — AMLODIPINE BESYLATE SCH MG: 10 TABLET ORAL at 10:02

## 2020-10-08 RX ADMIN — ATORVASTATIN CALCIUM SCH MG: 80 TABLET, FILM COATED ORAL at 21:23

## 2020-10-08 RX ADMIN — Medication SCH MG: at 21:23

## 2020-10-08 RX ADMIN — NICOTINE POLACRILEX PRN MG: 2 GUM, CHEWING ORAL at 10:05

## 2020-10-08 RX ADMIN — MICONAZOLE NITRATE SCH APPLIC: 20 CREAM TOPICAL at 10:01

## 2020-10-09 RX ADMIN — Medication SCH TAB: at 09:04

## 2020-10-09 RX ADMIN — PAROXETINE HYDROCHLORIDE SCH MG: 20 TABLET, FILM COATED ORAL at 09:03

## 2020-10-09 RX ADMIN — NICOTINE POLACRILEX PRN MG: 2 GUM, CHEWING ORAL at 14:17

## 2020-10-09 RX ADMIN — NICOTINE POLACRILEX PRN MG: 2 GUM, CHEWING ORAL at 09:05

## 2020-10-09 RX ADMIN — AMLODIPINE BESYLATE SCH MG: 10 TABLET ORAL at 09:03

## 2020-10-09 RX ADMIN — MIRTAZAPINE SCH MG: 30 TABLET, FILM COATED ORAL at 21:55

## 2020-10-09 RX ADMIN — TRAZODONE HYDROCHLORIDE SCH MG: 50 TABLET ORAL at 21:54

## 2020-10-09 RX ADMIN — ATORVASTATIN CALCIUM SCH MG: 80 TABLET, FILM COATED ORAL at 21:55

## 2020-10-09 RX ADMIN — CLOPIDOGREL BISULFATE SCH MG: 75 TABLET, FILM COATED ORAL at 09:04

## 2020-10-09 RX ADMIN — Medication SCH MG: at 21:54

## 2020-10-09 RX ADMIN — MICONAZOLE NITRATE SCH APPLIC: 20 CREAM TOPICAL at 21:55

## 2020-10-09 RX ADMIN — METHOCARBAMOL PRN MG: 500 TABLET ORAL at 21:58

## 2020-10-09 RX ADMIN — NICOTINE POLACRILEX PRN MG: 2 GUM, CHEWING ORAL at 11:33

## 2020-10-09 RX ADMIN — ACETAMINOPHEN PRN MG: 325 TABLET ORAL at 14:16

## 2020-10-09 RX ADMIN — METHOCARBAMOL PRN MG: 500 TABLET ORAL at 03:23

## 2020-10-09 RX ADMIN — NICOTINE POLACRILEX PRN MG: 2 GUM, CHEWING ORAL at 16:49

## 2020-10-09 RX ADMIN — NICOTINE SCH MG: 21 PATCH TRANSDERMAL at 09:03

## 2020-10-09 RX ADMIN — MICONAZOLE NITRATE SCH APPLIC: 20 CREAM TOPICAL at 09:02

## 2020-10-09 RX ADMIN — Medication SCH MG: at 21:53

## 2020-10-09 RX ADMIN — ACETAMINOPHEN PRN MG: 325 TABLET ORAL at 07:01

## 2020-10-09 RX ADMIN — NICOTINE POLACRILEX PRN MG: 2 GUM, CHEWING ORAL at 06:59

## 2020-10-10 RX ADMIN — Medication SCH TAB: at 10:03

## 2020-10-10 RX ADMIN — Medication SCH MG: at 21:31

## 2020-10-10 RX ADMIN — METHOCARBAMOL PRN MG: 500 TABLET ORAL at 21:29

## 2020-10-10 RX ADMIN — Medication SCH MG: at 21:28

## 2020-10-10 RX ADMIN — ACETAMINOPHEN PRN MG: 325 TABLET ORAL at 07:52

## 2020-10-10 RX ADMIN — TRAZODONE HYDROCHLORIDE SCH MG: 50 TABLET ORAL at 21:30

## 2020-10-10 RX ADMIN — NICOTINE SCH MG: 21 PATCH TRANSDERMAL at 10:02

## 2020-10-10 RX ADMIN — PAROXETINE HYDROCHLORIDE SCH MG: 20 TABLET, FILM COATED ORAL at 10:03

## 2020-10-10 RX ADMIN — MICONAZOLE NITRATE SCH: 20 CREAM TOPICAL at 21:30

## 2020-10-10 RX ADMIN — NICOTINE POLACRILEX PRN MG: 2 GUM, CHEWING ORAL at 06:51

## 2020-10-10 RX ADMIN — NICOTINE POLACRILEX PRN MG: 2 GUM, CHEWING ORAL at 10:02

## 2020-10-10 RX ADMIN — ATORVASTATIN CALCIUM SCH MG: 80 TABLET, FILM COATED ORAL at 21:30

## 2020-10-10 RX ADMIN — MICONAZOLE NITRATE SCH: 20 CREAM TOPICAL at 10:04

## 2020-10-10 RX ADMIN — MIRTAZAPINE SCH MG: 30 TABLET, FILM COATED ORAL at 21:30

## 2020-10-10 RX ADMIN — NICOTINE POLACRILEX PRN MG: 2 GUM, CHEWING ORAL at 16:29

## 2020-10-10 RX ADMIN — AMLODIPINE BESYLATE SCH MG: 10 TABLET ORAL at 10:02

## 2020-10-10 RX ADMIN — CLOPIDOGREL BISULFATE SCH MG: 75 TABLET, FILM COATED ORAL at 10:03

## 2020-10-10 RX ADMIN — NICOTINE POLACRILEX PRN MG: 2 GUM, CHEWING ORAL at 18:50

## 2020-10-11 RX ADMIN — NICOTINE SCH MG: 21 PATCH TRANSDERMAL at 09:41

## 2020-10-11 RX ADMIN — Medication SCH MG: at 23:02

## 2020-10-11 RX ADMIN — TRAZODONE HYDROCHLORIDE SCH MG: 50 TABLET ORAL at 21:30

## 2020-10-11 RX ADMIN — ACETAMINOPHEN PRN MG: 325 TABLET ORAL at 00:57

## 2020-10-11 RX ADMIN — AMLODIPINE BESYLATE SCH MG: 10 TABLET ORAL at 09:41

## 2020-10-11 RX ADMIN — MICONAZOLE NITRATE SCH: 20 CREAM TOPICAL at 21:31

## 2020-10-11 RX ADMIN — MICONAZOLE NITRATE SCH: 20 CREAM TOPICAL at 09:42

## 2020-10-11 RX ADMIN — NICOTINE POLACRILEX PRN MG: 2 GUM, CHEWING ORAL at 06:38

## 2020-10-11 RX ADMIN — PAROXETINE HYDROCHLORIDE SCH MG: 20 TABLET, FILM COATED ORAL at 09:41

## 2020-10-11 RX ADMIN — NICOTINE POLACRILEX PRN MG: 2 GUM, CHEWING ORAL at 08:50

## 2020-10-11 RX ADMIN — NICOTINE POLACRILEX PRN MG: 2 GUM, CHEWING ORAL at 00:59

## 2020-10-11 RX ADMIN — ATORVASTATIN CALCIUM SCH MG: 80 TABLET, FILM COATED ORAL at 21:31

## 2020-10-11 RX ADMIN — ACETAMINOPHEN PRN MG: 325 TABLET ORAL at 15:38

## 2020-10-11 RX ADMIN — Medication SCH TAB: at 09:41

## 2020-10-11 RX ADMIN — Medication SCH MG: at 21:31

## 2020-10-11 RX ADMIN — NICOTINE POLACRILEX PRN MG: 2 GUM, CHEWING ORAL at 15:38

## 2020-10-11 RX ADMIN — METHOCARBAMOL PRN MG: 500 TABLET ORAL at 21:30

## 2020-10-11 RX ADMIN — ACETAMINOPHEN PRN MG: 325 TABLET ORAL at 09:43

## 2020-10-11 RX ADMIN — CLOPIDOGREL BISULFATE SCH MG: 75 TABLET, FILM COATED ORAL at 09:41

## 2020-10-11 RX ADMIN — NICOTINE POLACRILEX PRN MG: 2 GUM, CHEWING ORAL at 11:53

## 2020-10-11 RX ADMIN — MIRTAZAPINE SCH MG: 30 TABLET, FILM COATED ORAL at 21:31

## 2020-10-12 VITALS — TEMPERATURE: 97.3 F | SYSTOLIC BLOOD PRESSURE: 145 MMHG | DIASTOLIC BLOOD PRESSURE: 94 MMHG | HEART RATE: 61 BPM

## 2020-10-12 RX ADMIN — PAROXETINE HYDROCHLORIDE SCH MG: 20 TABLET, FILM COATED ORAL at 09:02

## 2020-10-12 RX ADMIN — AMLODIPINE BESYLATE SCH MG: 10 TABLET ORAL at 09:02

## 2020-10-12 RX ADMIN — CLOPIDOGREL BISULFATE SCH MG: 75 TABLET, FILM COATED ORAL at 09:02

## 2020-10-12 RX ADMIN — ACETAMINOPHEN PRN MG: 325 TABLET ORAL at 07:08

## 2020-10-12 RX ADMIN — NICOTINE SCH MG: 21 PATCH TRANSDERMAL at 09:03

## 2020-10-12 RX ADMIN — MICONAZOLE NITRATE SCH: 20 CREAM TOPICAL at 09:04

## 2020-10-12 RX ADMIN — Medication SCH TAB: at 09:03

## 2021-11-30 ENCOUNTER — HOSPITAL ENCOUNTER (INPATIENT)
Dept: HOSPITAL 74 - YASAS | Age: 66
LOS: 3 days | Discharge: PSYCHIATRIC OR SUBSTANCE USE FACILITY | DRG: 897 | End: 2021-12-03
Attending: ALLERGY & IMMUNOLOGY | Admitting: ALLERGY & IMMUNOLOGY
Payer: COMMERCIAL

## 2021-11-30 VITALS — BODY MASS INDEX: 22.2 KG/M2

## 2021-11-30 DIAGNOSIS — Z99.89: ICD-10-CM

## 2021-11-30 DIAGNOSIS — E11.9: ICD-10-CM

## 2021-11-30 DIAGNOSIS — M19.90: ICD-10-CM

## 2021-11-30 DIAGNOSIS — I10: ICD-10-CM

## 2021-11-30 DIAGNOSIS — Z79.02: ICD-10-CM

## 2021-11-30 DIAGNOSIS — Z86.69: ICD-10-CM

## 2021-11-30 DIAGNOSIS — Z86.19: ICD-10-CM

## 2021-11-30 DIAGNOSIS — R45.81: ICD-10-CM

## 2021-11-30 DIAGNOSIS — F12.20: ICD-10-CM

## 2021-11-30 DIAGNOSIS — R76.8: ICD-10-CM

## 2021-11-30 DIAGNOSIS — F14.20: ICD-10-CM

## 2021-11-30 DIAGNOSIS — F17.210: ICD-10-CM

## 2021-11-30 DIAGNOSIS — Z88.5: ICD-10-CM

## 2021-11-30 DIAGNOSIS — F10.230: Primary | ICD-10-CM

## 2021-11-30 DIAGNOSIS — E78.5: ICD-10-CM

## 2021-11-30 DIAGNOSIS — Z95.5: ICD-10-CM

## 2021-11-30 DIAGNOSIS — Z79.84: ICD-10-CM

## 2021-11-30 DIAGNOSIS — D69.6: ICD-10-CM

## 2021-11-30 DIAGNOSIS — G47.00: ICD-10-CM

## 2021-11-30 DIAGNOSIS — F19.24: ICD-10-CM

## 2021-11-30 DIAGNOSIS — I25.10: ICD-10-CM

## 2021-11-30 DIAGNOSIS — R26.2: ICD-10-CM

## 2021-11-30 DIAGNOSIS — R79.89: ICD-10-CM

## 2021-11-30 PROCEDURE — U0003 INFECTIOUS AGENT DETECTION BY NUCLEIC ACID (DNA OR RNA); SEVERE ACUTE RESPIRATORY SYNDROME CORONAVIRUS 2 (SARS-COV-2) (CORONAVIRUS DISEASE [COVID-19]), AMPLIFIED PROBE TECHNIQUE, MAKING USE OF HIGH THROUGHPUT TECHNOLOGIES AS DESCRIBED BY CMS-2020-01-R: HCPCS

## 2021-11-30 PROCEDURE — U0005 INFEC AGEN DETEC AMPLI PROBE: HCPCS

## 2021-11-30 PROCEDURE — C9803 HOPD COVID-19 SPEC COLLECT: HCPCS

## 2021-11-30 PROCEDURE — HZ2ZZZZ DETOXIFICATION SERVICES FOR SUBSTANCE ABUSE TREATMENT: ICD-10-PCS | Performed by: ALLERGY & IMMUNOLOGY

## 2021-11-30 RX ADMIN — Medication SCH MG: at 23:02

## 2021-11-30 RX ADMIN — ATORVASTATIN CALCIUM SCH MG: 80 TABLET, FILM COATED ORAL at 23:02

## 2021-11-30 RX ADMIN — METHOCARBAMOL PRN MG: 500 TABLET ORAL at 23:04

## 2021-11-30 RX ADMIN — HYDROXYZINE PAMOATE SCH MG: 25 CAPSULE ORAL at 23:02

## 2021-11-30 RX ADMIN — AMLODIPINE BESYLATE SCH MG: 10 TABLET ORAL at 20:49

## 2021-12-01 LAB
ALBUMIN SERPL-MCNC: 3 G/DL (ref 3.4–5)
ALP SERPL-CCNC: 92 U/L (ref 45–117)
ALT SERPL-CCNC: 15 U/L (ref 13–61)
ANION GAP SERPL CALC-SCNC: 7 MMOL/L (ref 8–16)
AST SERPL-CCNC: 18 U/L (ref 15–37)
BILIRUB SERPL-MCNC: 0.3 MG/DL (ref 0.2–1)
BUN SERPL-MCNC: 26.3 MG/DL (ref 7–18)
CALCIUM SERPL-MCNC: 8.9 MG/DL (ref 8.5–10.1)
CHLORIDE SERPL-SCNC: 114 MMOL/L (ref 98–107)
CO2 SERPL-SCNC: 21 MMOL/L (ref 21–32)
CREAT SERPL-MCNC: 2 MG/DL (ref 0.55–1.3)
DEPRECATED RDW RBC AUTO: 17.5 % (ref 11.6–15.6)
GLUCOSE SERPL-MCNC: 117 MG/DL (ref 74–106)
HCT VFR BLD CALC: 41.1 % (ref 32.4–45.2)
HGB BLD-MCNC: 13.1 GM/DL (ref 10.7–15.3)
MCH RBC QN AUTO: 27.8 PG (ref 25.7–33.7)
MCHC RBC AUTO-ENTMCNC: 32 G/DL (ref 32–36)
MCV RBC: 87.1 FL (ref 80–96)
PLATELET # BLD AUTO: 99 10^3/UL (ref 134–434)
PMV BLD: 9.7 FL (ref 7.5–11.1)
PROT SERPL-MCNC: 6.3 G/DL (ref 6.4–8.2)
RBC # BLD AUTO: 4.72 M/MM3 (ref 3.6–5.2)
SODIUM SERPL-SCNC: 142 MMOL/L (ref 136–145)
WBC # BLD AUTO: 8.4 K/MM3 (ref 4–10)

## 2021-12-01 RX ADMIN — HYDROXYZINE PAMOATE SCH MG: 25 CAPSULE ORAL at 11:12

## 2021-12-01 RX ADMIN — HYDROXYZINE PAMOATE SCH MG: 25 CAPSULE ORAL at 06:29

## 2021-12-01 RX ADMIN — NICOTINE SCH MG: 21 PATCH TRANSDERMAL at 11:05

## 2021-12-01 RX ADMIN — HYDROXYZINE PAMOATE SCH MG: 25 CAPSULE ORAL at 22:40

## 2021-12-01 RX ADMIN — ASPIRIN SCH MG: 81 TABLET, COATED ORAL at 11:06

## 2021-12-01 RX ADMIN — HYDROXYZINE PAMOATE SCH MG: 25 CAPSULE ORAL at 18:00

## 2021-12-01 RX ADMIN — METFORMIN HYDROCHLORIDE SCH MG: 500 TABLET ORAL at 18:00

## 2021-12-01 RX ADMIN — AMLODIPINE BESYLATE SCH MG: 10 TABLET ORAL at 11:06

## 2021-12-01 RX ADMIN — METHOCARBAMOL PRN MG: 500 TABLET ORAL at 22:41

## 2021-12-01 RX ADMIN — CLOPIDOGREL BISULFATE SCH MG: 75 TABLET, FILM COATED ORAL at 11:06

## 2021-12-01 RX ADMIN — Medication SCH TAB: at 11:05

## 2021-12-01 RX ADMIN — Medication SCH MG: at 22:40

## 2021-12-01 RX ADMIN — INSULIN ASPART SCH: 100 INJECTION, SOLUTION INTRAVENOUS; SUBCUTANEOUS at 06:58

## 2021-12-01 RX ADMIN — TRAZODONE HYDROCHLORIDE SCH MG: 50 TABLET ORAL at 22:40

## 2021-12-01 RX ADMIN — ATORVASTATIN CALCIUM SCH MG: 80 TABLET, FILM COATED ORAL at 22:40

## 2021-12-01 RX ADMIN — METFORMIN HYDROCHLORIDE SCH MG: 500 TABLET ORAL at 07:48

## 2021-12-01 RX ADMIN — HYDROXYZINE PAMOATE SCH: 25 CAPSULE ORAL at 15:01

## 2021-12-02 LAB
ANION GAP SERPL CALC-SCNC: 11 MMOL/L (ref 8–16)
BUN SERPL-MCNC: 22.9 MG/DL (ref 7–18)
CALCIUM SERPL-MCNC: 9 MG/DL (ref 8.5–10.1)
CHLORIDE SERPL-SCNC: 115 MMOL/L (ref 98–107)
CO2 SERPL-SCNC: 16 MMOL/L (ref 21–32)
CREAT SERPL-MCNC: 1.9 MG/DL (ref 0.55–1.3)
GLUCOSE SERPL-MCNC: 73 MG/DL (ref 74–106)
SODIUM SERPL-SCNC: 142 MMOL/L (ref 136–145)

## 2021-12-02 RX ADMIN — Medication SCH TAB: at 10:23

## 2021-12-02 RX ADMIN — ASPIRIN SCH MG: 81 TABLET, COATED ORAL at 10:22

## 2021-12-02 RX ADMIN — INSULIN ASPART SCH: 100 INJECTION, SOLUTION INTRAVENOUS; SUBCUTANEOUS at 06:22

## 2021-12-02 RX ADMIN — HYDROXYZINE PAMOATE SCH MG: 25 CAPSULE ORAL at 22:27

## 2021-12-02 RX ADMIN — NICOTINE SCH MG: 21 PATCH TRANSDERMAL at 10:24

## 2021-12-02 RX ADMIN — HYDROXYZINE PAMOATE SCH MG: 25 CAPSULE ORAL at 17:39

## 2021-12-02 RX ADMIN — CLOPIDOGREL BISULFATE SCH MG: 75 TABLET, FILM COATED ORAL at 10:23

## 2021-12-02 RX ADMIN — HYDROXYZINE PAMOATE SCH: 25 CAPSULE ORAL at 15:13

## 2021-12-02 RX ADMIN — HYDROXYZINE PAMOATE SCH MG: 25 CAPSULE ORAL at 06:13

## 2021-12-02 RX ADMIN — METFORMIN HYDROCHLORIDE SCH MG: 500 TABLET ORAL at 17:39

## 2021-12-02 RX ADMIN — HYDROXYZINE PAMOATE SCH MG: 25 CAPSULE ORAL at 10:23

## 2021-12-02 RX ADMIN — Medication SCH MG: at 22:26

## 2021-12-02 RX ADMIN — ATORVASTATIN CALCIUM SCH MG: 80 TABLET, FILM COATED ORAL at 22:27

## 2021-12-02 RX ADMIN — METFORMIN HYDROCHLORIDE SCH MG: 500 TABLET ORAL at 07:29

## 2021-12-02 RX ADMIN — TRAZODONE HYDROCHLORIDE SCH MG: 50 TABLET ORAL at 22:27

## 2021-12-02 RX ADMIN — Medication SCH MG: at 22:27

## 2021-12-02 RX ADMIN — AMLODIPINE BESYLATE SCH MG: 10 TABLET ORAL at 10:23

## 2021-12-03 VITALS — TEMPERATURE: 98.2 F | DIASTOLIC BLOOD PRESSURE: 75 MMHG | HEART RATE: 84 BPM | SYSTOLIC BLOOD PRESSURE: 115 MMHG

## 2021-12-03 RX ADMIN — HYDROXYZINE PAMOATE SCH MG: 25 CAPSULE ORAL at 06:03

## 2021-12-03 RX ADMIN — HYDROXYZINE PAMOATE SCH MG: 25 CAPSULE ORAL at 11:02

## 2021-12-03 RX ADMIN — AMLODIPINE BESYLATE SCH MG: 10 TABLET ORAL at 11:02

## 2021-12-03 RX ADMIN — CLOPIDOGREL BISULFATE SCH MG: 75 TABLET, FILM COATED ORAL at 11:02

## 2021-12-03 RX ADMIN — HYDROXYZINE PAMOATE SCH: 25 CAPSULE ORAL at 14:11

## 2021-12-03 RX ADMIN — NICOTINE SCH: 21 PATCH TRANSDERMAL at 11:07

## 2021-12-03 RX ADMIN — INSULIN ASPART SCH: 100 INJECTION, SOLUTION INTRAVENOUS; SUBCUTANEOUS at 06:19

## 2021-12-03 RX ADMIN — METFORMIN HYDROCHLORIDE SCH MG: 500 TABLET ORAL at 08:03

## 2021-12-03 RX ADMIN — Medication SCH TAB: at 11:07

## 2021-12-03 RX ADMIN — ASPIRIN SCH MG: 81 TABLET, COATED ORAL at 11:02

## 2021-12-03 RX ADMIN — METFORMIN HYDROCHLORIDE SCH: 500 TABLET ORAL at 08:17

## 2022-07-25 ENCOUNTER — HOSPITAL ENCOUNTER (INPATIENT)
Dept: HOSPITAL 74 - YASAS | Age: 67
LOS: 7 days | Discharge: HOME | DRG: 897 | End: 2022-08-01
Attending: SURGERY | Admitting: ALLERGY & IMMUNOLOGY
Payer: COMMERCIAL

## 2022-07-25 VITALS — BODY MASS INDEX: 25.8 KG/M2

## 2022-07-25 DIAGNOSIS — Z95.5: ICD-10-CM
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
## 2022-07-25 DIAGNOSIS — Z86.69: ICD-10-CM

## 2022-07-25 DIAGNOSIS — F12.20: ICD-10-CM

## 2022-07-25 DIAGNOSIS — F19.280: ICD-10-CM

## 2022-07-25 DIAGNOSIS — Z99.89: ICD-10-CM

## 2022-07-25 DIAGNOSIS — Z86.11: ICD-10-CM

## 2022-07-25 DIAGNOSIS — F10.230: Primary | ICD-10-CM

## 2022-07-25 DIAGNOSIS — E11.9: ICD-10-CM

## 2022-07-25 DIAGNOSIS — Z86.19: ICD-10-CM
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
## 2022-07-25 DIAGNOSIS — J44.9: ICD-10-CM

## 2022-07-25 DIAGNOSIS — F17.210: ICD-10-CM

## 2022-07-25 DIAGNOSIS — F14.20: ICD-10-CM

## 2022-07-25 DIAGNOSIS — I25.10: ICD-10-CM

## 2022-07-25 DIAGNOSIS — G47.00: ICD-10-CM

## 2022-07-25 DIAGNOSIS — F33.1: ICD-10-CM

## 2022-07-25 DIAGNOSIS — Z88.6: ICD-10-CM

## 2022-07-25 DIAGNOSIS — S09.90XA: ICD-10-CM
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
## 2022-07-25 DIAGNOSIS — E78.5: ICD-10-CM

## 2022-07-25 DIAGNOSIS — Z91.51: ICD-10-CM

## 2022-07-25 DIAGNOSIS — F19.282: ICD-10-CM

## 2022-07-25 DIAGNOSIS — I10: ICD-10-CM

## 2022-07-25 DIAGNOSIS — R26.2: ICD-10-CM

## 2022-07-25 DIAGNOSIS — M17.0: ICD-10-CM

## 2022-07-25 PROCEDURE — HZ2ZZZZ DETOXIFICATION SERVICES FOR SUBSTANCE ABUSE TREATMENT: ICD-10-PCS | Performed by: SURGERY

## 2022-07-25 PROCEDURE — U0005 INFEC AGEN DETEC AMPLI PROBE: HCPCS

## 2022-07-25 PROCEDURE — U0003 INFECTIOUS AGENT DETECTION BY NUCLEIC ACID (DNA OR RNA); SEVERE ACUTE RESPIRATORY SYNDROME CORONAVIRUS 2 (SARS-COV-2) (CORONAVIRUS DISEASE [COVID-19]), AMPLIFIED PROBE TECHNIQUE, MAKING USE OF HIGH THROUGHPUT TECHNOLOGIES AS DESCRIBED BY CMS-2020-01-R: HCPCS

## 2022-07-25 RX ADMIN — ATORVASTATIN CALCIUM SCH MG: 80 TABLET, FILM COATED ORAL at 22:31

## 2022-07-25 RX ADMIN — NICOTINE SCH: 21 PATCH TRANSDERMAL at 17:34

## 2022-07-25 RX ADMIN — CLOPIDOGREL BISULFATE SCH MG: 75 TABLET, FILM COATED ORAL at 17:33

## 2022-07-25 RX ADMIN — Medication SCH MG: at 22:31

## 2022-07-25 RX ADMIN — Medication SCH TAB: at 17:34

## 2022-07-25 RX ADMIN — AMLODIPINE BESYLATE SCH MG: 10 TABLET ORAL at 17:33

## 2022-07-26 LAB
ALBUMIN SERPL-MCNC: 3.2 G/DL (ref 3.4–5)
ALP SERPL-CCNC: 88 U/L (ref 45–117)
ALT SERPL-CCNC: 28 U/L (ref 13–61)
ANION GAP SERPL CALC-SCNC: 8 MMOL/L (ref 8–16)
AST SERPL-CCNC: 23 U/L (ref 15–37)
BILIRUB SERPL-MCNC: 0.2 MG/DL (ref 0.2–1)
BUN SERPL-MCNC: 28 MG/DL (ref 7–18)
CALCIUM SERPL-MCNC: 8.9 MG/DL (ref 8.5–10.1)
CHLORIDE SERPL-SCNC: 112 MMOL/L (ref 98–107)
CO2 SERPL-SCNC: 23 MMOL/L (ref 21–32)
CREAT SERPL-MCNC: 1.8 MG/DL (ref 0.55–1.3)
DEPRECATED RDW RBC AUTO: 14.9 % (ref 11.6–15.6)
GLUCOSE SERPL-MCNC: 125 MG/DL (ref 74–106)
HCT VFR BLD CALC: 37.7 % (ref 32.4–45.2)
HGB BLD-MCNC: 12.1 GM/DL (ref 10.7–15.3)
MCH RBC QN AUTO: 27.2 PG (ref 25.7–33.7)
MCHC RBC AUTO-ENTMCNC: 32.1 G/DL (ref 32–36)
MCV RBC: 84.7 FL (ref 80–96)
PLATELET # BLD AUTO: 144 10^3/UL (ref 134–434)
PMV BLD: 9.7 FL (ref 7.5–11.1)
PROT SERPL-MCNC: 6.5 G/DL (ref 6.4–8.2)
RBC # BLD AUTO: 4.45 M/MM3 (ref 3.6–5.2)
SODIUM SERPL-SCNC: 143 MMOL/L (ref 136–145)
WBC # BLD AUTO: 6.9 K/MM3 (ref 4–10)

## 2022-07-26 RX ADMIN — TRAZODONE HYDROCHLORIDE SCH MG: 50 TABLET ORAL at 22:22

## 2022-07-26 RX ADMIN — HYDROXYZINE PAMOATE PRN MG: 25 CAPSULE ORAL at 17:33

## 2022-07-26 RX ADMIN — METHOCARBAMOL PRN MG: 500 TABLET ORAL at 10:38

## 2022-07-26 RX ADMIN — Medication SCH MG: at 22:21

## 2022-07-26 RX ADMIN — ATORVASTATIN CALCIUM SCH MG: 80 TABLET, FILM COATED ORAL at 22:22

## 2022-07-26 RX ADMIN — Medication SCH TAB: at 10:38

## 2022-07-26 RX ADMIN — NICOTINE SCH MG: 21 PATCH TRANSDERMAL at 10:37

## 2022-07-26 RX ADMIN — CLOPIDOGREL BISULFATE SCH MG: 75 TABLET, FILM COATED ORAL at 10:38

## 2022-07-26 RX ADMIN — AMLODIPINE BESYLATE SCH MG: 10 TABLET ORAL at 10:38

## 2022-07-27 LAB
BUN SERPL-MCNC: 30.9 MG/DL (ref 7–18)
CREAT SERPL-MCNC: 1.6 MG/DL (ref 0.55–1.3)

## 2022-07-27 RX ADMIN — CLOPIDOGREL BISULFATE SCH MG: 75 TABLET, FILM COATED ORAL at 10:24

## 2022-07-27 RX ADMIN — NICOTINE SCH MG: 21 PATCH TRANSDERMAL at 10:25

## 2022-07-27 RX ADMIN — TRAZODONE HYDROCHLORIDE SCH MG: 50 TABLET ORAL at 23:14

## 2022-07-27 RX ADMIN — Medication SCH TAB: at 10:24

## 2022-07-27 RX ADMIN — AMLODIPINE BESYLATE SCH MG: 10 TABLET ORAL at 10:24

## 2022-07-27 RX ADMIN — HYDROXYZINE PAMOATE PRN MG: 25 CAPSULE ORAL at 23:15

## 2022-07-27 RX ADMIN — HYDROXYZINE PAMOATE PRN MG: 25 CAPSULE ORAL at 05:58

## 2022-07-27 RX ADMIN — ATORVASTATIN CALCIUM SCH MG: 80 TABLET, FILM COATED ORAL at 23:14

## 2022-07-27 RX ADMIN — HYDROXYZINE PAMOATE PRN MG: 25 CAPSULE ORAL at 18:46

## 2022-07-27 RX ADMIN — Medication SCH MG: at 23:15

## 2022-07-28 RX ADMIN — Medication SCH: at 00:06

## 2022-07-28 RX ADMIN — Medication SCH TAB: at 11:55

## 2022-07-28 RX ADMIN — TRAZODONE HYDROCHLORIDE SCH MG: 50 TABLET ORAL at 22:27

## 2022-07-28 RX ADMIN — CLOPIDOGREL BISULFATE SCH MG: 75 TABLET, FILM COATED ORAL at 11:55

## 2022-07-28 RX ADMIN — Medication SCH MG: at 22:26

## 2022-07-28 RX ADMIN — HYDROXYZINE PAMOATE PRN MG: 25 CAPSULE ORAL at 22:28

## 2022-07-28 RX ADMIN — NICOTINE SCH MG: 21 PATCH TRANSDERMAL at 11:57

## 2022-07-28 RX ADMIN — HYDROXYZINE PAMOATE PRN MG: 25 CAPSULE ORAL at 18:18

## 2022-07-28 RX ADMIN — ATORVASTATIN CALCIUM SCH MG: 80 TABLET, FILM COATED ORAL at 22:27

## 2022-07-28 RX ADMIN — Medication SCH MG: at 22:27

## 2022-07-28 RX ADMIN — AMLODIPINE BESYLATE SCH MG: 10 TABLET ORAL at 11:55

## 2022-07-29 RX ADMIN — METHOCARBAMOL PRN MG: 500 TABLET ORAL at 18:24

## 2022-07-29 RX ADMIN — Medication SCH TAB: at 10:59

## 2022-07-29 RX ADMIN — CLOPIDOGREL BISULFATE SCH MG: 75 TABLET, FILM COATED ORAL at 10:59

## 2022-07-29 RX ADMIN — HYDROXYZINE PAMOATE PRN MG: 25 CAPSULE ORAL at 23:20

## 2022-07-29 RX ADMIN — AMLODIPINE BESYLATE SCH MG: 10 TABLET ORAL at 10:59

## 2022-07-29 RX ADMIN — Medication SCH MG: at 23:20

## 2022-07-29 RX ADMIN — TRAZODONE HYDROCHLORIDE SCH MG: 50 TABLET ORAL at 23:20

## 2022-07-29 RX ADMIN — NICOTINE SCH: 21 PATCH TRANSDERMAL at 12:06

## 2022-07-29 RX ADMIN — HYDROXYZINE PAMOATE PRN MG: 25 CAPSULE ORAL at 11:02

## 2022-07-29 RX ADMIN — ATORVASTATIN CALCIUM SCH MG: 80 TABLET, FILM COATED ORAL at 23:20

## 2022-07-30 ENCOUNTER — HOSPITAL ENCOUNTER (EMERGENCY)
Dept: HOSPITAL 74 - JER | Age: 67
Discharge: HOME | End: 2022-07-30
Payer: COMMERCIAL

## 2022-07-30 VITALS
DIASTOLIC BLOOD PRESSURE: 75 MMHG | RESPIRATION RATE: 18 BRPM | TEMPERATURE: 98.7 F | SYSTOLIC BLOOD PRESSURE: 108 MMHG | HEART RATE: 64 BPM

## 2022-07-30 VITALS — BODY MASS INDEX: 25.7 KG/M2

## 2022-07-30 DIAGNOSIS — M54.2: ICD-10-CM

## 2022-07-30 DIAGNOSIS — R51.9: Primary | ICD-10-CM
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
## 2022-07-30 RX ADMIN — Medication SCH MG: at 23:14

## 2022-07-30 RX ADMIN — ATORVASTATIN CALCIUM SCH MG: 80 TABLET, FILM COATED ORAL at 23:13

## 2022-07-30 RX ADMIN — TRAZODONE HYDROCHLORIDE SCH MG: 50 TABLET ORAL at 23:14

## 2022-07-30 RX ADMIN — Medication SCH MG: at 23:15

## 2022-07-30 RX ADMIN — AMLODIPINE BESYLATE SCH MG: 10 TABLET ORAL at 10:09

## 2022-07-30 RX ADMIN — NICOTINE SCH MG: 21 PATCH TRANSDERMAL at 10:09

## 2022-07-30 RX ADMIN — Medication SCH TAB: at 10:09

## 2022-07-30 RX ADMIN — CLOPIDOGREL BISULFATE SCH MG: 75 TABLET, FILM COATED ORAL at 10:09

## 2022-07-31 RX ADMIN — NICOTINE SCH MG: 21 PATCH TRANSDERMAL at 10:40

## 2022-07-31 RX ADMIN — Medication SCH TAB: at 10:40

## 2022-07-31 RX ADMIN — TRAZODONE HYDROCHLORIDE SCH MG: 50 TABLET ORAL at 22:37

## 2022-07-31 RX ADMIN — Medication SCH MG: at 22:38

## 2022-07-31 RX ADMIN — Medication SCH: at 22:44

## 2022-07-31 RX ADMIN — AMLODIPINE BESYLATE SCH MG: 10 TABLET ORAL at 10:40

## 2022-07-31 RX ADMIN — CLOPIDOGREL BISULFATE SCH MG: 75 TABLET, FILM COATED ORAL at 10:40

## 2022-07-31 RX ADMIN — HYDROXYZINE PAMOATE PRN MG: 25 CAPSULE ORAL at 10:40

## 2022-07-31 RX ADMIN — ATORVASTATIN CALCIUM SCH MG: 80 TABLET, FILM COATED ORAL at 22:37

## 2022-08-01 VITALS
RESPIRATION RATE: 17 BRPM | DIASTOLIC BLOOD PRESSURE: 86 MMHG | HEART RATE: 75 BPM | SYSTOLIC BLOOD PRESSURE: 151 MMHG | TEMPERATURE: 98.1 F

## 2022-08-01 RX ADMIN — Medication SCH TAB: at 10:21

## 2022-08-01 RX ADMIN — AMLODIPINE BESYLATE SCH MG: 10 TABLET ORAL at 12:06

## 2022-08-01 RX ADMIN — NICOTINE SCH MG: 21 PATCH TRANSDERMAL at 10:21

## 2022-08-01 RX ADMIN — CLOPIDOGREL BISULFATE SCH MG: 75 TABLET, FILM COATED ORAL at 10:21
